# Patient Record
Sex: FEMALE | Race: WHITE | NOT HISPANIC OR LATINO | URBAN - METROPOLITAN AREA
[De-identification: names, ages, dates, MRNs, and addresses within clinical notes are randomized per-mention and may not be internally consistent; named-entity substitution may affect disease eponyms.]

---

## 2017-08-11 ENCOUNTER — EMERGENCY (EMERGENCY)
Facility: HOSPITAL | Age: 27
LOS: 0 days | Discharge: HOME | End: 2017-08-11
Attending: FAMILY MEDICINE | Admitting: FAMILY MEDICINE
Payer: MEDICAID

## 2017-08-11 VITALS
HEIGHT: 72 IN | WEIGHT: 143.96 LBS | HEART RATE: 113 BPM | TEMPERATURE: 99 F | RESPIRATION RATE: 16 BRPM | OXYGEN SATURATION: 98 % | DIASTOLIC BLOOD PRESSURE: 76 MMHG | SYSTOLIC BLOOD PRESSURE: 143 MMHG

## 2017-08-11 VITALS
OXYGEN SATURATION: 98 % | RESPIRATION RATE: 18 BRPM | HEART RATE: 79 BPM | TEMPERATURE: 98 F | DIASTOLIC BLOOD PRESSURE: 72 MMHG | SYSTOLIC BLOOD PRESSURE: 106 MMHG
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
## 2017-08-11 DIAGNOSIS — S02.2XXA FRACTURE OF NASAL BONES, INITIAL ENCOUNTER FOR CLOSED FRACTURE: ICD-10-CM

## 2017-08-11 DIAGNOSIS — F17.210 NICOTINE DEPENDENCE, CIGARETTES, UNCOMPLICATED: ICD-10-CM

## 2017-08-11 PROCEDURE — 70160 X-RAY EXAM OF NASAL BONES: CPT | Mod: 26

## 2017-08-11 PROCEDURE — 99284 EMERGENCY DEPT VISIT MOD MDM: CPT

## 2017-08-11 PROCEDURE — 72070 X-RAY EXAM THORAC SPINE 2VWS: CPT | Mod: 26

## 2017-08-11 RX ORDER — IBUPROFEN 200 MG
400 TABLET ORAL ONCE
Qty: 0 | Refills: 0 | Status: COMPLETED | OUTPATIENT
Start: 2017-08-11 | End: 2017-08-11

## 2017-08-11 RX ADMIN — Medication 30 MILLILITER(S): at 23:22

## 2017-08-11 RX ADMIN — Medication 400 MILLIGRAM(S): at 23:22
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
## 2017-08-11 NOTE — ED PROVIDER NOTE - CONSTITUTIONAL, MLM
normal... Pt is upset, awake, alert, oriented to person, place, time/situation and in no apparent distress.
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
## 2017-08-11 NOTE — ED PROVIDER NOTE - ENMT, MLM
Airway patent, Nasal mucosa clear. Mouth with normal mucosa. Throat has no vesicles, no oropharyngeal exudates and uvula is midline. +abrasions to right cheek. +dried blood in B/L nares. +tender nasal bridge with some ecchymosis.

## 2017-08-11 NOTE — ED PROVIDER NOTE - MUSCULOSKELETAL, MLM
+midline thoracic back tenderness. +abrasions to back and anterior chest. +ecchymosis to both arms and ecchymosis to the right lateral thigh and left leg. HARDWICK x 4.

## 2017-08-13 ENCOUNTER — EMERGENCY (EMERGENCY)
Facility: HOSPITAL | Age: 27
LOS: 0 days | Discharge: HOME | End: 2017-08-13
Attending: EMERGENCY MEDICINE | Admitting: EMERGENCY MEDICINE
Payer: MEDICAID

## 2017-08-13 VITALS
TEMPERATURE: 99 F | HEART RATE: 72 BPM | WEIGHT: 134.92 LBS | RESPIRATION RATE: 18 BRPM | OXYGEN SATURATION: 100 % | DIASTOLIC BLOOD PRESSURE: 86 MMHG | HEIGHT: 64 IN | SYSTOLIC BLOOD PRESSURE: 131 MMHG
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
## 2017-08-13 DIAGNOSIS — R55 SYNCOPE AND COLLAPSE: ICD-10-CM

## 2017-08-13 DIAGNOSIS — S06.0X0A CONCUSSION WITHOUT LOSS OF CONSCIOUSNESS, INITIAL ENCOUNTER: ICD-10-CM

## 2017-08-13 DIAGNOSIS — G44.319 ACUTE POST-TRAUMATIC HEADACHE, NOT INTRACTABLE: ICD-10-CM

## 2017-08-13 DIAGNOSIS — Z88.5 ALLERGY STATUS TO NARCOTIC AGENT: ICD-10-CM
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
## 2017-08-13 PROCEDURE — 70450 CT HEAD/BRAIN W/O DYE: CPT | Mod: 26

## 2017-08-13 PROCEDURE — 72125 CT NECK SPINE W/O DYE: CPT | Mod: 26

## 2017-08-13 PROCEDURE — 99284 EMERGENCY DEPT VISIT MOD MDM: CPT

## 2017-08-13 RX ORDER — ONDANSETRON 8 MG/1
8 TABLET, FILM COATED ORAL ONCE
Qty: 0 | Refills: 0 | Status: COMPLETED | OUTPATIENT
Start: 2017-08-13 | End: 2017-08-13

## 2017-08-13 RX ADMIN — ONDANSETRON 8 MILLIGRAM(S): 8 TABLET, FILM COATED ORAL at 14:55
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
## 2017-08-13 NOTE — ED STATDOCS - PROGRESS NOTE DETAILS
ALTA Richardson: Patient has been seen, evaluated and orders have been written by the attending in intake. Patient is stable.  I will follow up the results of orders written and I will continue to evaluate/observe the patient. pt aware of results. stable for d/c home

## 2017-08-13 NOTE — ED STATDOCS - CHPI ED SYMPTOM NEG
no burning urination/no abdominal distention/no diarrhea/no blood in stool/no dysuria/no palpitations/no vomiting/no nausea/no hematuria/no fever

## 2017-08-13 NOTE — ED ADULT NURSE NOTE - CHPI ED SYMPTOMS NEG
no weakness/no loss of consciousness/no change in level of consciousness/no seizure/no syncope/no blurred vision

## 2017-08-13 NOTE — ED STATDOCS - ATTENDING CONTRIBUTION TO CARE
I, Aleksander Sharp, performed the initial face to face bedside interview with this patient regarding history of present illness, review of symptoms and relevant past medical, social and family history.  I completed an independent physical examination.  I was the initial provider who evaluated this patient. I have signed out the follow up of any pending tests (i.e. labs, radiological studies) to the ACP.  I have communicated the patient’s plan of care and disposition with the ACP.  The history, relevant review of systems, past medical and surgical history, medical decision making, and physical examination was documented by the scribe in my presence and I attest to the accuracy of the documentation.
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
## 2019-10-07 NOTE — ED ADULT NURSE NOTE - NS ED NURSE DISCH DISPOSITION
Medicare Wellness Visit  Plan for Preventive Care    A good way for you to stay healthy is to use preventive care.  Medicare covers many services that can help you stay healthy.* The goal of these services is to find any health problems as quickly as possible. Finding problems early can help make them easier to treat.  Your personal plan below lists the services you may need and when they are due.     Health Maintenance Summary     Lung Cancer Screening (Yearly)  Overdue since 3/22/2019    Abdominal Aortic Aneurysm (AAA) Screening (Once)  Overdue since 8/30/2019    Medicare Wellness 65+ (Yearly)  Overdue since 8/30/2019    Diabetes A1C (Every 6 Months)  Ordered on 9/12/2019    Diabetes Eye Exam (Yearly)  Next due on 5/30/2020    Diabetes Foot Exam (Yearly)  Next due on 6/13/2020    DM/CKD GFR (Yearly)  Next due on 9/12/2020    Pneumococcal Vaccine 65+ (2 of 2 - PCV13)  Next due on 9/12/2020    DTaP/Tdap/Td Vaccine (3 - Td)  Next due on 3/27/2028    Colorectal Cancer Screening-Colonoscopy (Every 10 Years)  Next due on 12/28/2028    Meningococcal Vaccine   Aged Out    Hepatitis C Screening   Completed    Shingles Vaccine   Completed    Influenza Vaccine   Completed           Preventive Care for Women and Men    Heart Screenings (Cardiovascular):  · Blood tests are used to check your cholesterol, lipid and triglyceride levels. High levels can increase your risk for heart disease and stroke. High levels can be treated with medications, diet and exercise. Lowering your levels can help keep your heart and blood vessels healthy.  Your provider will order these tests if they are needed.    · An ultrasound is done to see if you have an abdominal aortic aneurysm (AAA).  This is an enlargement of one of the main blood vessels that delivers blood to the body.   In the [place], 9,000 deaths are caused by AAA.  You may not even know you have this problem and as many as 1 in 3 people will have a serious problem if it is  not treated.  Early diagnosis allows for more effective treatment and cure.  If you have a family history of AAA or are a male age 65-75 who has smoked, you are at higher risk of an AAA.  Your provider can order this test, if needed.    Colorectal Screening:  · There are many tests that are used to check for cancer of your colon and rectum. You and your provider should discuss what test is best for you and when to have it done.  Options include:  · Screening Colonoscopy: exam of the entire colon, seen through a flexible lighted tube.  · Flexible Sigmoidoscopy: exam of the last third (sigmoid portion) of the colon and rectum, seen through a flexible lighted tube.  · Cologuard DNA stool test: a sample of your stool is used to screen for cancer and unseen blood in your stool.  · Fecal Occult Blood Test: a sample of your stool is studied to find any unseen blood    Flu Shot:  · An immunization that helps to prevent influenza (the flu). You should get this every year. The best time to get the shot is in the fall.    Pneumococcal Shot:  • Vaccines are available that can help prevent pneumococcal disease, which is any type of infection caused by Streptococcus pneumoniae bacteria.   Their use can prevent some cases of pneumonia, meningitis, and sepsis. There are two types of pneumococcal vaccines:   o Conjugate vaccines (PCV-13 or Prevnar 13®) - helps protect against the 13 types of pneumococcal bacteria that are the most common causes of serious infections in children and adults.    o Polysaccharide vaccine (PPSV23 or Roiypcxnl87®) - helps protect against 23 types of pneumococcal bacteria for patients who are recommended to get it.  These vaccines should be given at least 12 months apart.  A booster is usually not needed.     Hepatitis B Shot:  · An immunization that helps to protect people from getting Hepatitis B. Hepatitis B is a virus that spreads through contact with infected blood or body fluids. Many people with the  virus do not have symptoms.  The virus can lead to serious problems, such as liver disease. Some people are at higher risk than others. Your doctor will tell you if you need this shot.     Diabetes Screening:  · A test to measure sugar (glucose) in your blood is called a fasting blood sugar. Fasting means you cannot have food or drink for at least 8 hours before the test. This test can detect diabetes long before you may notice symptoms.    Glaucoma Screening:  · Glaucoma screening is performed by your eye doctor. The test measures the fluid pressure inside your eyes to determine if you have glaucoma.     Hepatitis C Screening:  · A blood test to see if you have the hepatitis C virus.  Hepatitis C attacks the liver and is a major cause of chronic liver disease.  Medicare will cover a single screening for all adults born between 1945 & 1965, or high risk patients (people who have injected illegal drugs or people who have had blood transfusions).  High risk patients who continue to inject illegal drugs can be screened for Hepatitis C every year.    Smoking and Tobacco-Use Cessation Counseling:  · Tobacco is the single greatest cause of disease and early death in our country today. Medication and counseling together can increase a person’s chance of quitting for good.   · Medicare covers two quitting attempts per year, with four counseling sessions per attempt (eight sessions in a 12 month period)    Preventive Screening tests for Women    Screening Mammograms and Breast Exams:  · An x-ray of your breasts to check for breast cancer before you or your doctor may be able to feel it.  If breast cancer is found early it can usually be treated with success.    Pelvic Exams and Pap Tests:  · An exam to check for cervical and vaginal cancer. A Pap test is a lab test in which cells are taken from your cervix and sent to the lab to look for signs of cervical cancer. If cancer of the cervix is found early, chances for a cure are  good. Testing can generally end at age 65, or if a woman has a hysterectomy for a benign condition. Your provider may recommend more frequent testing if certain abnormal results are found.    Bone Mass Measurements:  · A painless x-ray of your bone density to see if you are at risk for a broken bone. Bone density refers to the thickness of bones or how tightly the bone tissue is packed.    Preventive Screening tests for Men    Prostate Screening:  · Should you have a prostate cancer test (PSA)?  It is up to you to decide if you want a prostate cancer test. Talk to your clinician to find out if the test is right for you.  Things for you to consider and talk about should include:  · Benefits and harms of the test  · Your family history  · How your race/ethnicity may influence the test  · If the test may impact other medical conditions you have  · Your values on screenings and treatments    *Medicare pays for many preventive services to keep you healthy. For some of these services, you might have to pay a deductible, coinsurance, and / or copayment.  The amounts vary depending on the type of services you need and the kind of Medicare health plan you have.               Discharged

## 2023-04-26 NOTE — ED PROVIDER NOTE - SCRIBE NAME
Patients phone number is not in service.   Multiple attempts in the past have been made to contact patient to schedule appt.   She has not been seen in over 1 year. Will need an appointment before refills can be sent.     Hub may relay message and schedule.     [facility] message also sent      Na Garcia